# Patient Record
Sex: FEMALE | Race: WHITE
[De-identification: names, ages, dates, MRNs, and addresses within clinical notes are randomized per-mention and may not be internally consistent; named-entity substitution may affect disease eponyms.]

---

## 2019-09-18 ENCOUNTER — HOSPITAL ENCOUNTER (EMERGENCY)
Dept: HOSPITAL 100 - ED | Age: 39
Discharge: PSYCHIATRIC OR SUBSTANCE USE FACILITY | End: 2019-09-18
Payer: MEDICAID

## 2019-09-18 VITALS — RESPIRATION RATE: 16 BRPM

## 2019-09-18 VITALS
RESPIRATION RATE: 16 BRPM | SYSTOLIC BLOOD PRESSURE: 118 MMHG | DIASTOLIC BLOOD PRESSURE: 70 MMHG | HEART RATE: 87 BPM | OXYGEN SATURATION: 99 %

## 2019-09-18 VITALS — BODY MASS INDEX: 18.3 KG/M2

## 2019-09-18 VITALS
RESPIRATION RATE: 16 BRPM | HEART RATE: 106 BPM | SYSTOLIC BLOOD PRESSURE: 120 MMHG | DIASTOLIC BLOOD PRESSURE: 83 MMHG | OXYGEN SATURATION: 98 %

## 2019-09-18 VITALS
DIASTOLIC BLOOD PRESSURE: 100 MMHG | RESPIRATION RATE: 17 BRPM | HEART RATE: 113 BPM | TEMPERATURE: 98.1 F | SYSTOLIC BLOOD PRESSURE: 123 MMHG | OXYGEN SATURATION: 93 %

## 2019-09-18 DIAGNOSIS — M79.7: ICD-10-CM

## 2019-09-18 DIAGNOSIS — F43.10: ICD-10-CM

## 2019-09-18 DIAGNOSIS — Z59.0: ICD-10-CM

## 2019-09-18 DIAGNOSIS — Z72.0: ICD-10-CM

## 2019-09-18 DIAGNOSIS — R56.9: ICD-10-CM

## 2019-09-18 DIAGNOSIS — F22: ICD-10-CM

## 2019-09-18 DIAGNOSIS — F31.9: Primary | ICD-10-CM

## 2019-09-18 DIAGNOSIS — J44.9: ICD-10-CM

## 2019-09-18 DIAGNOSIS — F41.9: ICD-10-CM

## 2019-09-18 DIAGNOSIS — F29: ICD-10-CM

## 2019-09-18 DIAGNOSIS — Z79.899: ICD-10-CM

## 2019-09-18 LAB
ALCOHOL, BLOOD (MEDICAL)-SERUM: 7 MG/DL
AMPHET UR-MCNC: POSITIVE NG/ML
ANION GAP: 6 (ref 5–15)
B-HCG SERPL QL: NEGATIVE NEGATIVE
BARBITURATE URINE VISTA: NEGATIVE
BENZODIAZEPINE URINE VISTA: NEGATIVE
BUN SERPL-MCNC: 20 MG/DL (ref 7–18)
BUN/CREAT RATIO: 25.1 RATIO (ref 10–20)
CALCIUM SERPL-MCNC: 9.7 MG/DL (ref 8.5–10.1)
CARBON DIOXIDE: 23 MMOL/L (ref 21–32)
CHLORIDE: 110 MMOL/L (ref 98–107)
COCAINE URINE VISTA: NEGATIVE
DEPRECATED RDW RBC: 45 FL (ref 35.1–43.9)
DRUG CONFIRMATION TO FOLLOW?: (no result)
ECSTACY URINE VISTA: NEGATIVE
ERYTHROCYTE [DISTWIDTH] IN BLOOD: 14 % (ref 11.6–14.6)
EST GLOM FILT RATE - AFR AMER: 104 ML/MIN (ref 60–?)
ESTIMATED CREATININE CLEARANCE: 75.26 ML/MIN
GLUCOSE: 89 MG/DL (ref 74–106)
HCG SERPL QL: NEGATIVE NEGATIVE
HCT VFR BLD AUTO: 41.8 % (ref 37–47)
HEMOGLOBIN: 14.2 G/DL (ref 12–15)
HGB BLD-MCNC: 14.2 G/DL (ref 12–15)
IMMATURE GRANULOCYTES COUNT: 0.02 X10^3/UL (ref 0–0)
INTERNAL QC VALIDATED?: (no result)
MCV RBC: 87.1 FL (ref 81–99)
MEAN CORP HGB CONC: 34 G/DL (ref 32–36)
MEAN PLATELET VOL.: 9.1 FL (ref 6.2–12)
METHADONE URINE VISTA: NEGATIVE
MUCOUS THREADS URNS QL MICRO: (no result) /HPF
NRBC FLAGGED BY ANALYZER: 0 % (ref 0–5)
PCP UR QL: NEGATIVE
PH UR: 7 [PH]
PLATELET # BLD: 286 K/MM3 (ref 150–450)
PLATELET COUNT: 286 K/MM3 (ref 150–450)
POTASSIUM: 3.9 MMOL/L (ref 3.5–5.1)
RBC # BLD AUTO: 4.8 M/MM3 (ref 4.2–5.4)
RBC DISTRIBUTION WIDTH CV: 14 % (ref 11.6–14.6)
RBC DISTRIBUTION WIDTH SD: 45 FL (ref 35.1–43.9)
RBC UR QL: (no result) /HPF (ref 0–5)
RBC UR QL: 150 /UL
SP GR UR: 1.01 (ref 1–1.03)
SQUAMOUS URNS QL MICRO: (no result) /HPF (ref 5–10)
THC URINE VISTA: POSITIVE
URINE PRESERVATIVE: (no result)
WBC # BLD AUTO: 10.3 K/MM3 (ref 4.4–11)
WHITE BLOOD COUNT: 10.3 K/MM3 (ref 4.4–11)

## 2019-09-18 PROCEDURE — 80307 DRUG TEST PRSMV CHEM ANLYZR: CPT

## 2019-09-18 PROCEDURE — 81001 URINALYSIS AUTO W/SCOPE: CPT

## 2019-09-18 PROCEDURE — 80320 DRUG SCREEN QUANTALCOHOLS: CPT

## 2019-09-18 PROCEDURE — 99282 EMERGENCY DEPT VISIT SF MDM: CPT

## 2019-09-18 PROCEDURE — 85025 COMPLETE CBC W/AUTO DIFF WBC: CPT

## 2019-09-18 PROCEDURE — 84703 CHORIONIC GONADOTROPIN ASSAY: CPT

## 2019-09-18 PROCEDURE — 96372 THER/PROPH/DIAG INJ SC/IM: CPT

## 2019-09-18 PROCEDURE — 80048 BASIC METABOLIC PNL TOTAL CA: CPT

## 2019-09-18 PROCEDURE — 93005 ELECTROCARDIOGRAM TRACING: CPT

## 2019-09-18 PROCEDURE — 84484 ASSAY OF TROPONIN QUANT: CPT

## 2019-09-18 PROCEDURE — G0480 DRUG TEST DEF 1-7 CLASSES: HCPCS

## 2019-09-18 SDOH — ECONOMIC STABILITY - HOUSING INSECURITY: HOMELESSNESS: Z59.0

## 2020-02-01 ENCOUNTER — HOSPITAL ENCOUNTER (EMERGENCY)
Age: 40
Discharge: PSYCHIATRIC OR SUBSTANCE USE FACILITY | End: 2020-02-01
Payer: MEDICAID

## 2020-02-01 VITALS — RESPIRATION RATE: 18 BRPM | OXYGEN SATURATION: 98 % | HEART RATE: 111 BPM

## 2020-02-01 VITALS
RESPIRATION RATE: 13 BRPM | HEART RATE: 69 BPM | OXYGEN SATURATION: 94 % | DIASTOLIC BLOOD PRESSURE: 93 MMHG | SYSTOLIC BLOOD PRESSURE: 129 MMHG

## 2020-02-01 VITALS
HEART RATE: 106 BPM | TEMPERATURE: 98.2 F | DIASTOLIC BLOOD PRESSURE: 93 MMHG | OXYGEN SATURATION: 97 % | RESPIRATION RATE: 16 BRPM | SYSTOLIC BLOOD PRESSURE: 123 MMHG

## 2020-02-01 VITALS
RESPIRATION RATE: 11 BRPM | SYSTOLIC BLOOD PRESSURE: 120 MMHG | OXYGEN SATURATION: 95 % | HEART RATE: 95 BPM | DIASTOLIC BLOOD PRESSURE: 77 MMHG

## 2020-02-01 VITALS — BODY MASS INDEX: 21.6 KG/M2 | BODY MASS INDEX: 18.3 KG/M2

## 2020-02-01 VITALS
HEART RATE: 94 BPM | DIASTOLIC BLOOD PRESSURE: 65 MMHG | SYSTOLIC BLOOD PRESSURE: 118 MMHG | OXYGEN SATURATION: 99 % | RESPIRATION RATE: 11 BRPM

## 2020-02-01 VITALS
HEART RATE: 116 BPM | DIASTOLIC BLOOD PRESSURE: 74 MMHG | SYSTOLIC BLOOD PRESSURE: 133 MMHG | OXYGEN SATURATION: 100 % | RESPIRATION RATE: 23 BRPM

## 2020-02-01 VITALS — RESPIRATION RATE: 18 BRPM

## 2020-02-01 DIAGNOSIS — F41.9: ICD-10-CM

## 2020-02-01 DIAGNOSIS — Z88.0: ICD-10-CM

## 2020-02-01 DIAGNOSIS — Z88.5: ICD-10-CM

## 2020-02-01 DIAGNOSIS — Z88.8: ICD-10-CM

## 2020-02-01 DIAGNOSIS — F43.10: ICD-10-CM

## 2020-02-01 DIAGNOSIS — F19.11: ICD-10-CM

## 2020-02-01 DIAGNOSIS — F31.9: Primary | ICD-10-CM

## 2020-02-01 DIAGNOSIS — F17.200: ICD-10-CM

## 2020-02-01 LAB
ALANINE AMINOTRANSFER ALT/SGPT: 26 U/L (ref 13–56)
ALBUMIN SERPL-MCNC: 4 G/DL (ref 3.2–5)
ALCOHOL, BLOOD (MEDICAL)-SERUM: < 3 MG/DL
ALKALINE PHOSPHATASE: 93 U/L (ref 45–117)
AMPHET UR-MCNC: POSITIVE NG/ML
ANION GAP: 8 (ref 5–15)
AST(SGOT): 14 U/L (ref 15–37)
B-HCG SERPL QL: NEGATIVE NEGATIVE
BARBITURATE URINE VISTA: NEGATIVE
BENZODIAZEPINE URINE VISTA: NEGATIVE
BUN SERPL-MCNC: 15 MG/DL (ref 7–18)
BUN/CREAT RATIO: 24.4 RATIO (ref 10–20)
CALCIUM SERPL-MCNC: 9.4 MG/DL (ref 8.5–10.1)
CARBON DIOXIDE: 23 MMOL/L (ref 21–32)
CHLORIDE: 108 MMOL/L (ref 98–107)
COCAINE URINE VISTA: NEGATIVE
DEPRECATED RDW RBC: 43.1 FL (ref 35.1–43.9)
DRUG CONFIRMATION TO FOLLOW?: (no result)
ECSTACY URINE VISTA: NEGATIVE
ERYTHROCYTE [DISTWIDTH] IN BLOOD: 13.5 % (ref 11.6–14.6)
EST GLOM FILT RATE - AFR AMER: 140 ML/MIN (ref 60–?)
ESTIMATED CREATININE CLEARANCE: 112.52 ML/MIN
GLOBULIN: 3.6 G/DL (ref 2.2–4.2)
GLUCOSE: 94 MG/DL (ref 74–106)
HCG SERPL QL: NEGATIVE NEGATIVE
HCT VFR BLD AUTO: 40.2 % (ref 37–47)
HEMOGLOBIN: 13.3 G/DL (ref 12–15)
HGB BLD-MCNC: 13.3 G/DL (ref 12–15)
IMMATURE GRANULOCYTES COUNT: 0.03 X10^3/UL (ref 0–0)
INTERNAL QC VALIDATED?: (no result)
MCV RBC: 88 FL (ref 81–99)
MEAN CORP HGB CONC: 33.1 G/DL (ref 32–36)
MEAN PLATELET VOL.: 8.9 FL (ref 6.2–12)
METHADONE URINE VISTA: NEGATIVE
NRBC FLAGGED BY ANALYZER: 0 % (ref 0–5)
PCP UR QL: NEGATIVE
PH UR: 7 [PH]
PLATELET # BLD: 311 K/MM3 (ref 150–450)
PLATELET COUNT: 311 K/MM3 (ref 150–450)
POTASSIUM: 3.5 MMOL/L (ref 3.5–5.1)
RBC # BLD AUTO: 4.57 M/MM3 (ref 4.2–5.4)
RBC DISTRIBUTION WIDTH CV: 13.5 % (ref 11.6–14.6)
RBC DISTRIBUTION WIDTH SD: 43.1 FL (ref 35.1–43.9)
THC URINE VISTA: POSITIVE
WBC # BLD AUTO: 10.8 K/MM3 (ref 4.4–11)
WHITE BLOOD COUNT: 10.8 K/MM3 (ref 4.4–11)

## 2020-02-01 PROCEDURE — 80320 DRUG SCREEN QUANTALCOHOLS: CPT

## 2020-02-01 PROCEDURE — G0480 DRUG TEST DEF 1-7 CLASSES: HCPCS

## 2020-02-01 PROCEDURE — 80053 COMPREHEN METABOLIC PANEL: CPT

## 2020-02-01 PROCEDURE — 80307 DRUG TEST PRSMV CHEM ANLYZR: CPT

## 2020-02-01 PROCEDURE — 96374 THER/PROPH/DIAG INJ IV PUSH: CPT

## 2020-02-01 PROCEDURE — 84703 CHORIONIC GONADOTROPIN ASSAY: CPT

## 2020-02-01 PROCEDURE — A4216 STERILE WATER/SALINE, 10 ML: HCPCS

## 2020-02-01 PROCEDURE — 99285 EMERGENCY DEPT VISIT HI MDM: CPT

## 2020-02-01 PROCEDURE — 93005 ELECTROCARDIOGRAM TRACING: CPT

## 2020-02-01 PROCEDURE — 85025 COMPLETE CBC W/AUTO DIFF WBC: CPT

## 2020-02-02 ENCOUNTER — HOSPITAL ENCOUNTER (EMERGENCY)
Age: 40
Discharge: HOME | End: 2020-02-03
Attending: EMERGENCY MEDICINE
Payer: COMMERCIAL

## 2020-02-02 ENCOUNTER — APPOINTMENT (OUTPATIENT)
Dept: GENERAL RADIOLOGY | Age: 40
End: 2020-02-02
Payer: COMMERCIAL

## 2020-02-02 ENCOUNTER — APPOINTMENT (OUTPATIENT)
Dept: CT IMAGING | Age: 40
End: 2020-02-02
Payer: COMMERCIAL

## 2020-02-02 PROCEDURE — 2720000011 HC SANE KIT SUPPLY STERILE

## 2020-02-02 PROCEDURE — 96372 THER/PROPH/DIAG INJ SC/IM: CPT

## 2020-02-02 PROCEDURE — 70450 CT HEAD/BRAIN W/O DYE: CPT

## 2020-02-02 PROCEDURE — 6370000000 HC RX 637 (ALT 250 FOR IP): Performed by: NURSE PRACTITIONER

## 2020-02-02 PROCEDURE — 71045 X-RAY EXAM CHEST 1 VIEW: CPT

## 2020-02-02 PROCEDURE — 2500000003 HC RX 250 WO HCPCS: Performed by: EMERGENCY MEDICINE

## 2020-02-02 PROCEDURE — 99285 EMERGENCY DEPT VISIT HI MDM: CPT

## 2020-02-02 PROCEDURE — 6360000002 HC RX W HCPCS: Performed by: EMERGENCY MEDICINE

## 2020-02-02 PROCEDURE — 6370000000 HC RX 637 (ALT 250 FOR IP): Performed by: EMERGENCY MEDICINE

## 2020-02-02 RX ORDER — LORAZEPAM 1 MG/1
2 TABLET ORAL ONCE
Status: COMPLETED | OUTPATIENT
Start: 2020-02-02 | End: 2020-02-02

## 2020-02-02 RX ORDER — ACETAMINOPHEN 500 MG
1000 TABLET ORAL ONCE
Status: COMPLETED | OUTPATIENT
Start: 2020-02-02 | End: 2020-02-02

## 2020-02-02 RX ADMIN — LIDOCAINE HYDROCHLORIDE 250 MG: 10 INJECTION, SOLUTION EPIDURAL; INFILTRATION; INTRACAUDAL; PERINEURAL at 23:08

## 2020-02-02 RX ADMIN — LORAZEPAM 2 MG: 1 TABLET ORAL at 20:42

## 2020-02-02 RX ADMIN — ACETAMINOPHEN 1000 MG: 500 TABLET ORAL at 19:41

## 2020-02-02 ASSESSMENT — PAIN SCALES - GENERAL
PAINLEVEL_OUTOF10: 8
PAINLEVEL_OUTOF10: 6

## 2020-02-02 ASSESSMENT — PAIN DESCRIPTION - PAIN TYPE: TYPE: ACUTE PAIN

## 2020-02-02 ASSESSMENT — PAIN DESCRIPTION - LOCATION: LOCATION: PELVIS

## 2020-02-03 VITALS
TEMPERATURE: 98.5 F | RESPIRATION RATE: 18 BRPM | HEART RATE: 88 BPM | HEIGHT: 63 IN | SYSTOLIC BLOOD PRESSURE: 111 MMHG | DIASTOLIC BLOOD PRESSURE: 74 MMHG | OXYGEN SATURATION: 99 % | BODY MASS INDEX: 18.61 KG/M2 | WEIGHT: 105 LBS

## 2020-02-03 PROCEDURE — 6370000000 HC RX 637 (ALT 250 FOR IP): Performed by: EMERGENCY MEDICINE

## 2020-02-03 RX ORDER — DOXYCYCLINE HYCLATE 100 MG
100 TABLET ORAL 2 TIMES DAILY
Qty: 20 TABLET | Refills: 0 | Status: SHIPPED | OUTPATIENT
Start: 2020-02-03 | End: 2020-02-13

## 2020-02-03 RX ORDER — DOXYCYCLINE HYCLATE 100 MG/1
100 CAPSULE ORAL ONCE
Status: COMPLETED | OUTPATIENT
Start: 2020-02-03 | End: 2020-02-03

## 2020-02-03 RX ADMIN — DOXYCYCLINE HYCLATE 100 MG: 100 CAPSULE ORAL at 01:06
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
## 2020-02-03 NOTE — ED NOTES
D/c papers and follow up instructions received from Dr Refugio Henriquez at this time.  N2N called to Jeff Ariza at [facility]     Rose Galicia RN  02/03/20 3915

## 2020-10-22 ENCOUNTER — HOSPITAL ENCOUNTER (EMERGENCY)
Age: 40
LOS: 1 days | Discharge: HOME | End: 2020-10-23
Payer: MEDICAID

## 2020-10-22 VITALS
RESPIRATION RATE: 28 BRPM | OXYGEN SATURATION: 96 % | SYSTOLIC BLOOD PRESSURE: 106 MMHG | HEART RATE: 81 BPM | DIASTOLIC BLOOD PRESSURE: 66 MMHG

## 2020-10-22 VITALS
RESPIRATION RATE: 28 BRPM | OXYGEN SATURATION: 99 % | TEMPERATURE: 97.9 F | HEART RATE: 93 BPM | SYSTOLIC BLOOD PRESSURE: 100 MMHG | DIASTOLIC BLOOD PRESSURE: 65 MMHG

## 2020-10-22 VITALS — BODY MASS INDEX: 21.6 KG/M2 | BODY MASS INDEX: 21.9 KG/M2

## 2020-10-22 VITALS — OXYGEN SATURATION: 99 %

## 2020-10-22 DIAGNOSIS — F17.200: ICD-10-CM

## 2020-10-22 DIAGNOSIS — F41.9: ICD-10-CM

## 2020-10-22 DIAGNOSIS — R56.9: Primary | ICD-10-CM

## 2020-10-22 DIAGNOSIS — F31.9: ICD-10-CM

## 2020-10-22 DIAGNOSIS — R07.89: ICD-10-CM

## 2020-10-22 LAB
ANION GAP: 12 (ref 5–15)
BUN SERPL-MCNC: 13 MG/DL (ref 7–18)
BUN/CREAT RATIO: 16.6 RATIO (ref 10–20)
CALCIUM SERPL-MCNC: 9.6 MG/DL (ref 8.5–10.1)
CARBON DIOXIDE: 21 MMOL/L (ref 21–32)
CHLORIDE: 108 MMOL/L (ref 98–107)
DEPRECATED RDW RBC: 45.1 FL (ref 35.1–43.9)
ERYTHROCYTE [DISTWIDTH] IN BLOOD: 13.9 % (ref 11.6–14.6)
EST GLOM FILT RATE - AFR AMER: 105 ML/MIN (ref 60–?)
ESTIMATED CREATININE CLEARANCE: 87.13 ML/MIN
GLUCOSE: 103 MG/DL (ref 74–106)
HCT VFR BLD AUTO: 42.9 % (ref 37–47)
HEMOGLOBIN: 14.3 G/DL (ref 12–15)
HGB BLD-MCNC: 14.3 G/DL (ref 12–15)
IMMATURE GRANULOCYTES COUNT: 0.04 X10^3/UL (ref 0–0)
INTERNAL QC VALIDATED?: (no result)
KETONE-DIPSTICK: 150 MG/DL
LEUKOCYTE ESTERASE UR QL STRIP: 500 /UL
MAGNESIUM: 1.6 MG/DL (ref 1.6–2.6)
MCV RBC: 87.6 FL (ref 81–99)
MEAN CORP HGB CONC: 33.3 G/DL (ref 32–36)
MEAN PLATELET VOL.: 9.2 FL (ref 6.2–12)
MUCOUS THREADS URNS QL MICRO: (no result) /HPF
NRBC FLAGGED BY ANALYZER: 0 % (ref 0–5)
PLATELET # BLD: 308 K/MM3 (ref 150–450)
PLATELET COUNT: 308 K/MM3 (ref 150–450)
POTASSIUM: 3.7 MMOL/L (ref 3.5–5.1)
PROT UR QL STRIP.AUTO: 30 MG/DL
RBC # BLD AUTO: 4.9 M/MM3 (ref 4.2–5.4)
RBC DISTRIBUTION WIDTH CV: 13.9 % (ref 11.6–14.6)
RBC DISTRIBUTION WIDTH SD: 45.1 FL (ref 35.1–43.9)
RBC UR QL: (no result) /HPF (ref 0–5)
RBC UR QL: 10 /UL
SP GR UR: 1.02 (ref 1–1.03)
SQUAMOUS URNS QL MICRO: (no result) /HPF (ref 5–10)
URINE PRESERVATIVE: (no result)
WBC # BLD AUTO: 11.8 K/MM3 (ref 4.4–11)
WHITE BLOOD COUNT: 11.8 K/MM3 (ref 4.4–11)

## 2020-10-22 PROCEDURE — 99285 EMERGENCY DEPT VISIT HI MDM: CPT

## 2020-10-22 PROCEDURE — 71045 X-RAY EXAM CHEST 1 VIEW: CPT

## 2020-10-22 PROCEDURE — 83735 ASSAY OF MAGNESIUM: CPT

## 2020-10-22 PROCEDURE — 80048 BASIC METABOLIC PNL TOTAL CA: CPT

## 2020-10-22 PROCEDURE — 85025 COMPLETE CBC W/AUTO DIFF WBC: CPT

## 2020-10-22 PROCEDURE — 81001 URINALYSIS AUTO W/SCOPE: CPT

## 2020-10-22 PROCEDURE — A4216 STERILE WATER/SALINE, 10 ML: HCPCS

## 2020-10-22 PROCEDURE — 81025 URINE PREGNANCY TEST: CPT

## 2020-10-22 PROCEDURE — 93005 ELECTROCARDIOGRAM TRACING: CPT

## 2020-10-22 PROCEDURE — 84484 ASSAY OF TROPONIN QUANT: CPT

## 2020-10-23 VITALS
HEART RATE: 71 BPM | SYSTOLIC BLOOD PRESSURE: 104 MMHG | OXYGEN SATURATION: 97 % | RESPIRATION RATE: 16 BRPM | DIASTOLIC BLOOD PRESSURE: 71 MMHG

## 2020-10-23 VITALS
DIASTOLIC BLOOD PRESSURE: 73 MMHG | OXYGEN SATURATION: 98 % | RESPIRATION RATE: 12 BRPM | HEART RATE: 70 BPM | SYSTOLIC BLOOD PRESSURE: 108 MMHG

## 2021-12-27 ENCOUNTER — HOSPITAL ENCOUNTER (EMERGENCY)
Age: 41
LOS: 1 days | Discharge: HOME | End: 2021-12-28
Payer: MEDICAID

## 2021-12-27 VITALS — BODY MASS INDEX: 25.7 KG/M2

## 2021-12-27 VITALS
OXYGEN SATURATION: 97 % | SYSTOLIC BLOOD PRESSURE: 109 MMHG | HEART RATE: 94 BPM | RESPIRATION RATE: 18 BRPM | TEMPERATURE: 97.5 F | DIASTOLIC BLOOD PRESSURE: 77 MMHG

## 2021-12-27 DIAGNOSIS — Z79.899: ICD-10-CM

## 2021-12-27 DIAGNOSIS — F41.9: ICD-10-CM

## 2021-12-27 DIAGNOSIS — U07.1: Primary | ICD-10-CM

## 2021-12-27 DIAGNOSIS — G40.909: ICD-10-CM

## 2021-12-27 DIAGNOSIS — M79.7: ICD-10-CM

## 2021-12-27 DIAGNOSIS — J44.9: ICD-10-CM

## 2021-12-27 DIAGNOSIS — F17.210: ICD-10-CM

## 2021-12-27 PROCEDURE — 87426 SARSCOV CORONAVIRUS AG IA: CPT

## 2021-12-27 PROCEDURE — 99282 EMERGENCY DEPT VISIT SF MDM: CPT

## 2021-12-27 PROCEDURE — 71045 X-RAY EXAM CHEST 1 VIEW: CPT

## 2024-04-15 ENCOUNTER — HOSPITAL ENCOUNTER (EMERGENCY)
Facility: HOSPITAL | Age: 44
Discharge: LEFT BEFORE DISCHARGE | End: 2024-04-15
Payer: MEDICAID

## 2024-04-15 ENCOUNTER — HOSPITAL ENCOUNTER (OUTPATIENT)
Dept: CARDIOLOGY | Facility: HOSPITAL | Age: 44
Discharge: HOME | End: 2024-04-15
Payer: MEDICAID

## 2024-04-15 VITALS
WEIGHT: 145 LBS | SYSTOLIC BLOOD PRESSURE: 107 MMHG | TEMPERATURE: 97.3 F | RESPIRATION RATE: 18 BRPM | OXYGEN SATURATION: 98 % | HEART RATE: 91 BPM | BODY MASS INDEX: 25.69 KG/M2 | DIASTOLIC BLOOD PRESSURE: 73 MMHG | HEIGHT: 63 IN

## 2024-04-15 PROCEDURE — 99281 EMR DPT VST MAYX REQ PHY/QHP: CPT

## 2024-04-15 PROCEDURE — 93005 ELECTROCARDIOGRAM TRACING: CPT

## 2024-04-15 ASSESSMENT — PAIN - FUNCTIONAL ASSESSMENT: PAIN_FUNCTIONAL_ASSESSMENT: 0-10

## 2024-04-15 ASSESSMENT — PAIN DESCRIPTION - LOCATION
LOCATION_2: JAW
LOCATION: CHEST

## 2024-04-15 ASSESSMENT — PAIN SCALES - GENERAL
PAINLEVEL_OUTOF10: 9
PAINLEVEL_OUTOF10: 10 - WORST POSSIBLE PAIN

## 2024-04-15 ASSESSMENT — COLUMBIA-SUICIDE SEVERITY RATING SCALE - C-SSRS
2. HAVE YOU ACTUALLY HAD ANY THOUGHTS OF KILLING YOURSELF?: NO
1. IN THE PAST MONTH, HAVE YOU WISHED YOU WERE DEAD OR WISHED YOU COULD GO TO SLEEP AND NOT WAKE UP?: NO
6. HAVE YOU EVER DONE ANYTHING, STARTED TO DO ANYTHING, OR PREPARED TO DO ANYTHING TO END YOUR LIFE?: NO

## 2024-04-18 LAB
ATRIAL RATE: 95 BPM
P AXIS: 81 DEGREES
PR INTERVAL: 162 MS
Q ONSET: 249 MS
QRS COUNT: 14 BEATS
QRS DURATION: 88 MS
QT INTERVAL: 343 MS
QTC CALCULATION(BAZETT): 423 MS
QTC FREDERICIA: 394 MS
R AXIS: 77 DEGREES
T AXIS: 59 DEGREES
T OFFSET: 421 MS
VENTRICULAR RATE: 91 BPM

## 2025-09-05 ENCOUNTER — HOSPITAL ENCOUNTER (EMERGENCY)
Age: 45
Discharge: HOME | End: 2025-09-06
Attending: EMERGENCY MEDICINE
Payer: MEDICAID

## 2025-09-05 ENCOUNTER — APPOINTMENT (OUTPATIENT)
Dept: GENERAL RADIOLOGY | Age: 45
End: 2025-09-05
Payer: MEDICAID

## 2025-09-05 DIAGNOSIS — J20.9 ACUTE BRONCHITIS, UNSPECIFIED ORGANISM: ICD-10-CM

## 2025-09-05 DIAGNOSIS — R06.00 DYSPNEA AND RESPIRATORY ABNORMALITIES: ICD-10-CM

## 2025-09-05 DIAGNOSIS — J06.9 ACUTE UPPER RESPIRATORY INFECTION: Primary | ICD-10-CM

## 2025-09-05 DIAGNOSIS — R06.89 DYSPNEA AND RESPIRATORY ABNORMALITIES: ICD-10-CM

## 2025-09-05 DIAGNOSIS — J44.9 CHRONIC OBSTRUCTIVE PULMONARY DISEASE, UNSPECIFIED COPD TYPE (HCC): ICD-10-CM

## 2025-09-05 PROCEDURE — 87636 SARSCOV2 & INF A&B AMP PRB: CPT

## 2025-09-05 PROCEDURE — 71045 X-RAY EXAM CHEST 1 VIEW: CPT

## 2025-09-05 RX ORDER — METHYLPREDNISOLONE 4 MG/1
4 TABLET ORAL SEE ADMIN INSTRUCTIONS
COMMUNITY

## 2025-09-05 RX ORDER — PRAZOSIN HYDROCHLORIDE 2 MG/1
2 CAPSULE ORAL NIGHTLY
COMMUNITY

## 2025-09-05 RX ORDER — FAMOTIDINE 20 MG/1
20 TABLET, FILM COATED ORAL 2 TIMES DAILY
COMMUNITY

## 2025-09-05 RX ORDER — DOXYCYCLINE 100 MG/1
100 CAPSULE ORAL 2 TIMES DAILY
COMMUNITY

## 2025-09-05 RX ORDER — PALIPERIDONE 6 MG/1
6 TABLET, EXTENDED RELEASE ORAL EVERY MORNING
COMMUNITY
End: 2025-09-05 | Stop reason: ALTCHOICE

## 2025-09-05 RX ORDER — ESTRADIOL 1 MG/1
1 TABLET ORAL DAILY
COMMUNITY

## 2025-09-05 RX ORDER — VENLAFAXINE HYDROCHLORIDE 150 MG/1
150 CAPSULE, EXTENDED RELEASE ORAL DAILY
COMMUNITY

## 2025-09-05 RX ORDER — PREGABALIN 200 MG/1
200 CAPSULE ORAL 2 TIMES DAILY
COMMUNITY

## 2025-09-05 RX ORDER — GUAIFENESIN 600 MG/1
1200 TABLET, EXTENDED RELEASE ORAL 2 TIMES DAILY
COMMUNITY

## 2025-09-06 VITALS
SYSTOLIC BLOOD PRESSURE: 126 MMHG | OXYGEN SATURATION: 97 % | BODY MASS INDEX: 29.71 KG/M2 | RESPIRATION RATE: 16 BRPM | HEART RATE: 99 BPM | WEIGHT: 174 LBS | TEMPERATURE: 98 F | HEIGHT: 64 IN | DIASTOLIC BLOOD PRESSURE: 68 MMHG

## 2025-09-06 PROBLEM — J20.9 ACUTE BRONCHITIS: Status: ACTIVE | Noted: 2025-09-06

## 2025-09-06 PROBLEM — J44.9 CHRONIC OBSTRUCTIVE PULMONARY DISEASE (HCC): Status: ACTIVE | Noted: 2025-09-06

## 2025-09-06 PROBLEM — J06.9 ACUTE UPPER RESPIRATORY INFECTION: Status: ACTIVE | Noted: 2025-09-06

## 2025-09-06 LAB
ALBUMIN SERPL-MCNC: 4.3 G/DL (ref 3.5–5.2)
ALP SERPL-CCNC: 98 U/L (ref 35–104)
ALT SERPL-CCNC: 20 U/L (ref 0–35)
AMYLASE SERPL-CCNC: 29 U/L (ref 28–100)
ANION GAP SERPL CALCULATED.3IONS-SCNC: 12 MMOL/L (ref 7–16)
AST SERPL-CCNC: 18 U/L (ref 0–35)
BILIRUB DIRECT SERPL-MCNC: 0.1 MG/DL (ref 0–0.2)
BILIRUB INDIRECT SERPL-MCNC: NORMAL MG/DL (ref 0–1)
BILIRUB SERPL-MCNC: <0.2 MG/DL (ref 0–1.2)
BNP SERPL-MCNC: 73 PG/ML (ref 0–125)
BUN SERPL-MCNC: 14 MG/DL (ref 6–20)
CALCIUM SERPL-MCNC: 9.3 MG/DL (ref 8.6–10)
CHLORIDE SERPL-SCNC: 104 MMOL/L (ref 98–107)
CK SERPL-CCNC: 100 U/L (ref 0–170)
CO2 SERPL-SCNC: 23 MMOL/L (ref 22–29)
CREAT SERPL-MCNC: 0.5 MG/DL (ref 0.5–1)
D-DIMER QUANTITATIVE: <200 NG/ML DDU (ref 0–230)
EKG ATRIAL RATE: 92 BPM
EKG P AXIS: 81 DEGREES
EKG P-R INTERVAL: 160 MS
EKG Q-T INTERVAL: 368 MS
EKG QRS DURATION: 92 MS
EKG QTC CALCULATION (BAZETT): 455 MS
EKG R AXIS: 81 DEGREES
EKG T AXIS: 63 DEGREES
EKG VENTRICULAR RATE: 92 BPM
ERYTHROCYTE [DISTWIDTH] IN BLOOD BY AUTOMATED COUNT: 15.3 % (ref 11.5–15)
FLUAV RNA RESP QL NAA+PROBE: NOT DETECTED
FLUBV RNA RESP QL NAA+PROBE: NOT DETECTED
GFR, ESTIMATED: >90 ML/MIN/1.73M2
GLUCOSE SERPL-MCNC: 125 MG/DL (ref 74–99)
HCT VFR BLD AUTO: 36.9 % (ref 34–48)
HGB BLD-MCNC: 12.4 G/DL (ref 11.5–15.5)
INR PPP: 1.1
LIPASE SERPL-CCNC: 22 U/L (ref 13–60)
MAGNESIUM SERPL-MCNC: 2.1 MG/DL (ref 1.6–2.6)
MCH RBC QN AUTO: 29.6 PG (ref 26–35)
MCHC RBC AUTO-ENTMCNC: 33.6 G/DL (ref 32–34.5)
MCV RBC AUTO: 88.1 FL (ref 80–99.9)
PH VENOUS: 7.36 (ref 7.35–7.45)
PLATELET # BLD AUTO: 215 K/UL (ref 130–450)
PMV BLD AUTO: 9.4 FL (ref 7–12)
POTASSIUM SERPL-SCNC: 3.9 MMOL/L (ref 3.5–5.1)
PROT SERPL-MCNC: 7 G/DL (ref 6.4–8.3)
PROTHROMBIN TIME: 11.5 SEC (ref 9.3–12.4)
RBC # BLD AUTO: 4.19 M/UL (ref 3.5–5.5)
SARS-COV-2 RNA RESP QL NAA+PROBE: NOT DETECTED
SODIUM SERPL-SCNC: 140 MMOL/L (ref 136–145)
SOURCE: NORMAL
SPECIMEN DESCRIPTION: NORMAL
TROPONIN I SERPL HS-MCNC: <6 NG/L (ref 0–14)
WBC OTHER # BLD: 9.6 K/UL (ref 4.5–11.5)

## 2025-09-06 PROCEDURE — 80053 COMPREHEN METABOLIC PANEL: CPT

## 2025-09-06 PROCEDURE — 93005 ELECTROCARDIOGRAM TRACING: CPT | Performed by: EMERGENCY MEDICINE

## 2025-09-06 PROCEDURE — 85379 FIBRIN DEGRADATION QUANT: CPT

## 2025-09-06 PROCEDURE — 83690 ASSAY OF LIPASE: CPT

## 2025-09-06 PROCEDURE — 85610 PROTHROMBIN TIME: CPT

## 2025-09-06 PROCEDURE — 6360000002 HC RX W HCPCS: Performed by: EMERGENCY MEDICINE

## 2025-09-06 PROCEDURE — 85027 COMPLETE CBC AUTOMATED: CPT

## 2025-09-06 PROCEDURE — 83735 ASSAY OF MAGNESIUM: CPT

## 2025-09-06 PROCEDURE — 82550 ASSAY OF CK (CPK): CPT

## 2025-09-06 PROCEDURE — 6370000000 HC RX 637 (ALT 250 FOR IP): Performed by: EMERGENCY MEDICINE

## 2025-09-06 PROCEDURE — 82150 ASSAY OF AMYLASE: CPT

## 2025-09-06 PROCEDURE — 83880 ASSAY OF NATRIURETIC PEPTIDE: CPT

## 2025-09-06 PROCEDURE — 82248 BILIRUBIN DIRECT: CPT

## 2025-09-06 PROCEDURE — 84484 ASSAY OF TROPONIN QUANT: CPT

## 2025-09-06 PROCEDURE — 93010 ELECTROCARDIOGRAM REPORT: CPT | Performed by: INTERNAL MEDICINE

## 2025-09-06 PROCEDURE — 2580000003 HC RX 258: Performed by: EMERGENCY MEDICINE

## 2025-09-06 PROCEDURE — 82800 BLOOD PH: CPT

## 2025-09-06 RX ORDER — AZITHROMYCIN 250 MG/1
TABLET, FILM COATED ORAL
Qty: 1 PACKET | Refills: 0 | Status: SHIPPED | OUTPATIENT
Start: 2025-09-06 | End: 2025-09-10

## 2025-09-06 RX ORDER — IPRATROPIUM BROMIDE AND ALBUTEROL SULFATE 2.5; .5 MG/3ML; MG/3ML
1 SOLUTION RESPIRATORY (INHALATION)
Status: COMPLETED | OUTPATIENT
Start: 2025-09-06 | End: 2025-09-06

## 2025-09-06 RX ORDER — ALBUTEROL SULFATE 90 UG/1
2 INHALANT RESPIRATORY (INHALATION) EVERY 6 HOURS PRN
Qty: 18 G | Refills: 3 | Status: SHIPPED | OUTPATIENT
Start: 2025-09-06

## 2025-09-06 RX ORDER — DEXAMETHASONE SODIUM PHOSPHATE 10 MG/ML
10 INJECTION, SOLUTION INTRA-ARTICULAR; INTRALESIONAL; INTRAMUSCULAR; INTRAVENOUS; SOFT TISSUE ONCE
Status: COMPLETED | OUTPATIENT
Start: 2025-09-06 | End: 2025-09-06

## 2025-09-06 RX ORDER — AZITHROMYCIN 250 MG/1
500 TABLET, FILM COATED ORAL ONCE
Status: DISCONTINUED | OUTPATIENT
Start: 2025-09-06 | End: 2025-09-06

## 2025-09-06 RX ORDER — AZITHROMYCIN 250 MG/1
500 TABLET, FILM COATED ORAL ONCE
Status: COMPLETED | OUTPATIENT
Start: 2025-09-06 | End: 2025-09-06

## 2025-09-06 RX ORDER — 0.9 % SODIUM CHLORIDE 0.9 %
1000 INTRAVENOUS SOLUTION INTRAVENOUS ONCE
Status: COMPLETED | OUTPATIENT
Start: 2025-09-06 | End: 2025-09-06

## 2025-09-06 RX ADMIN — IPRATROPIUM BROMIDE AND ALBUTEROL SULFATE 1 DOSE: 2.5; .5 SOLUTION RESPIRATORY (INHALATION) at 01:14

## 2025-09-06 RX ADMIN — SODIUM CHLORIDE 1000 ML: 0.9 INJECTION, SOLUTION INTRAVENOUS at 01:20

## 2025-09-06 RX ADMIN — DEXAMETHASONE SODIUM PHOSPHATE 10 MG: 10 INJECTION INTRAMUSCULAR; INTRAVENOUS at 01:24

## 2025-09-06 RX ADMIN — AZITHROMYCIN 500 MG: 250 TABLET, FILM COATED ORAL at 01:30

## 2025-09-06 RX ADMIN — IPRATROPIUM BROMIDE AND ALBUTEROL SULFATE 1 DOSE: 2.5; .5 SOLUTION RESPIRATORY (INHALATION) at 01:17

## 2025-09-06 RX ADMIN — IPRATROPIUM BROMIDE AND ALBUTEROL SULFATE 1 DOSE: 2.5; .5 SOLUTION RESPIRATORY (INHALATION) at 01:19
